# Patient Record
(demographics unavailable — no encounter records)

---

## 2025-01-16 NOTE — ASSESSMENT
[FreeTextEntry1] : Hypertension increase lisinopril  Follow-up with me in the psychiatrist has yearly physical and may

## 2025-01-16 NOTE — HISTORY OF PRESENT ILLNESS
[de-identified] : Hypertension blood pressure 156/90 has been borderline elevated in the past on lisinopril 20 will increase to 40 mg  Followed by psychiatry at [facility] on several medications unsure what they are he will return with a proper list  Elevated PSA PI-RADS score was 2 seen by Dr. Bucio we will repeat PSA.  BPH controlled with tamsulosin  Uses alprazolam rarely for anxiety  Clonazepam was effective for periodic leg movements however psychiatrist discontinued it

## 2025-01-27 NOTE — ASSESSMENT
[FreeTextEntry1] : dysuria, elev PSA, likely prostatitis - check urine cx, start bactrim course. pt has appt with uro

## 2025-01-27 NOTE — HISTORY OF PRESENT ILLNESS
[FreeTextEntry8] : Pt c/o increased urinary frequency started 2 days ago. Pt was going 20x a day w/ some dysuria. PSA 2 weeks ago was increased. Pt also f/u [facility], pt on livalo but no longer covered. Would like to switch to generic pitavastatin. Pt was on crestor 8 yrs ago but didn't tolerate it well due to myalgias.

## 2025-04-02 NOTE — HISTORY OF PRESENT ILLNESS
[FreeTextEntry1] : 03/25/2025: 76-year-old male presents for follow-up. Accompanied by his wife.   After last visit, repeat PSA was still elevated: 7.92/20%.   Patient had MRI prostate.     Seen on 2/11/2025 for Elevated PSA.   Recent PSA with PCP went up.  Denied any recent unintentional weight loss, night sweats and new bone or back pain.  Had family history of Prostate cancer: Father and Brother.  Patient had not had Prostate biopsy. Had MRI Prostate in the past.  On 1/27/2025 had bother with urination, urinary frequency, urgency and dysuria at tip of the penis.  Not related to sexual activity.  Saw PCP treated with Bactrim. Urination was back to baseline.  On Flomax, had variable stream with tapering at end, daytime frequency 5 to 10 x and nocturia 0 to 1 x.  Denied hesitancy, straining, intermittency, urgency, incontinence or sense of incomplete emptying.  Denied dysuria, hematuria, lower abdominal or flank pain, nausea, vomiting, fever, chills or rigors.  Had erectile dysfunction, rated erections: 3/5. With Tadalafil 20 mg got good response.  Not sexually active. Had low libido.  Took medicines for Anxiety and Depression.   PSA: 6.26 (2/9/2022).  MRI Prostate (3/1/2022): Prostate volume: 71 cc.  No MRI targetable lesions. Patchy inflammatory type enhancement in the peripheral zone. PIRADS 2.   Saw me in 2019 and subsequently saw Dr Bucio.  O positive

## 2025-04-02 NOTE — PHYSICAL EXAM
[General Appearance - Well Developed] : well developed [General Appearance - Well Nourished] : well nourished [Normal Appearance] : normal appearance [Well Groomed] : well groomed [General Appearance - In No Acute Distress] : no acute distress [Edema] : no peripheral edema [] : no respiratory distress [Respiration, Rhythm And Depth] : normal respiratory rhythm and effort [Exaggerated Use Of Accessory Muscles For Inspiration] : no accessory muscle use [Normal Station and Gait] : the gait and station were normal for the patient's age [Oriented To Time, Place, And Person] : oriented to person, place, and time [Not Anxious] : not anxious [Abdomen Soft] : soft [Abdomen Tenderness] : non-tender

## 2025-04-02 NOTE — ASSESSMENT
[FreeTextEntry1] : Benign Prostatic Hyperplasia: Incomplete bladder emptying: Patient drank fluid for uroflow and PVR however, was not able to urinate today. Per patient, urination was off.   Bladder scan showed residual of 457 ml.   Patient was recommended to urinate from home and return for visit to do bladder scan. If unable to do that, recommended to do RBUS.   Will continue Flomax.     Elevated PSA: PSA: 7.92/20% (2/25/2025). MRI prostate (3/20/2025): Prostate volume: 70 cc, previously 71 cc. Patchy inflammatory type enhancement throughout the peripheral zone. No MRI targetable lesions. PI-RADS 2. Will continue PSA monitoring. Will do PSA at 3 and 6 months.    Will inform results.  Return to office in 6 months or sooner if there are any issues.

## 2025-04-02 NOTE — END OF VISIT
[Time Spent: ___ minutes] : I have spent [unfilled] minutes of time on the encounter which excludes teaching and separately reported services. [FreeTextEntry3] : Parts of this note were generated by using [employer] services and Dragon medical dictation software. A reasonable effort was made to proofread and correct its contents, but typos and mistakes may still remain. If there are any questions or points of clarification needed, please contact my office.    Prior to appointment and during encounter with patient extensive medical records were reviewed including but not limited to, hospital records, outpatient records, imaging results and lab data if available. During this appointment the patient was examined, diagnoses were discussed and explained in a face-to-face manner. In addition, extensive time was spent reviewing aforementioned diagnostic studies. Counseling including test results, differential diagnoses, treatment options, risk and benefits, lifestyle changes, current condition, and current medications was performed. Patient's questions and concerns were addressed. Patient verbalized understanding of the treatment plan. Time spent is for reviewing chart, labs and images if available, counseling and care coordination.

## 2025-04-02 NOTE — HISTORY OF PRESENT ILLNESS
[FreeTextEntry1] : 03/25/2025: 76-year-old male presents for follow-up. Accompanied by his wife.   After last visit, repeat PSA was still elevated: 7.92/20%.   Patient had MRI prostate.     Seen on 2/11/2025 for Elevated PSA.   Recent PSA with PCP went up.  Denied any recent unintentional weight loss, night sweats and new bone or back pain.  Had family history of Prostate cancer: Father and Brother.  Patient had not had Prostate biopsy. Had MRI Prostate in the past.  On 1/27/2025 had bother with urination, urinary frequency, urgency and dysuria at tip of the penis.  Not related to sexual activity.  Saw PCP treated with Bactrim. Urination was back to baseline.  On Flomax, had variable stream with tapering at end, daytime frequency 5 to 10 x and nocturia 0 to 1 x.  Denied hesitancy, straining, intermittency, urgency, incontinence or sense of incomplete emptying.  Denied dysuria, hematuria, lower abdominal or flank pain, nausea, vomiting, fever, chills or rigors.  Had erectile dysfunction, rated erections: 3/5. With Tadalafil 20 mg got good response.  Not sexually active. Had low libido.  Took medicines for Anxiety and Depression.   PSA: 6.26 (2/9/2022).  MRI Prostate (3/1/2022): Prostate volume: 71 cc.  No MRI targetable lesions. Patchy inflammatory type enhancement in the peripheral zone. PIRADS 2.   Saw me in 2019 and subsequently saw Dr Bucio.

## 2025-04-02 NOTE — END OF VISIT
[Time Spent: ___ minutes] : I have spent [unfilled] minutes of time on the encounter which excludes teaching and separately reported services. [FreeTextEntry3] : Parts of this note were generated by using [employer] services and Dragon medical dictation software. A reasonable effort was made to proofread and correct its contents, but typos and mistakes may still remain. If there are any questions or points of clarification needed, please contact my office.    Prior to appointment and during encounter with patient extensive medical records were reviewed including but not limited to, hospital records, outpatient records, imaging results and lab data if available. During this appointment the patient was examined, diagnoses were discussed and explained in a face-to-face manner. In addition, extensive time was spent reviewing aforementioned diagnostic studies. Counseling including test results, differential diagnoses, treatment options, risk and benefits, lifestyle changes, current condition, and current medications was performed. Patient's questions and concerns were addressed. Patient verbalized understanding of the treatment plan. Time spent is for reviewing chart, labs and images if available, counseling and care coordination.

## 2025-04-02 NOTE — END OF VISIT
[Time Spent: ___ minutes] : I have spent [unfilled] minutes of time on the encounter which excludes teaching and separately reported services. [FreeTextEntry3] : Parts of this note were generated by using [employer] services and Dragon medical dictation software. A reasonable effort was made to proofread and correct its contents, but typos and mistakes may still remain. If there are any questions or points of clarification needed, please contact my office.    Prior to appointment and during encounter with patient extensive medical records were reviewed including but not limited to, hospital records, outpatient records, imaging results and lab data if available. During this appointment the patient was examined, diagnoses were discussed and explained in a face-to-face manner. In addition, extensive time was spent reviewing aforementioned diagnostic studies. Counseling including test results, differential diagnoses, treatment options, risk and benefits, lifestyle changes, current condition, and current medications was performed. Patient's questions and concerns were addressed. Patient verbalized understanding of the treatment plan. Time spent is for reviewing chart, labs and images if available, counseling and care coordination.

## 2025-04-02 NOTE — ADDENDUM
[FreeTextEntry1] :  Diana RUELAS assisted in documentation on 03/25/2025  acting as a scribe for Dr. Thom Davis.

## 2025-05-30 NOTE — HISTORY OF PRESENT ILLNESS
[FreeTextEntry1] : Medicare annual [de-identified] : Patient has no complaints  He asked to stop taking generic Livalo insurance will not cover we will appeal stopped statin approximately 3 weeks ago.  He is taking it for primary prevention  Elevated PSA with obstructive symptoms followed by urology now on Flomax 0.8 at bedtime  Anxious depression followed by psychiatry  Hypertension controlled with lisinopril  Continues to smoke discussion held patient knows he needs to quit screening CAT scan ordered

## 2025-05-30 NOTE — REVIEW OF SYSTEMS
[Hesitancy] : hesitancy [Frequency] : frequency [Negative] : Gastrointestinal [FreeTextEntry8] : Postvoid dribbling

## 2025-05-30 NOTE — ASSESSMENT
[FreeTextEntry1] : Renew medications patient will try to obtain his statin will consider CT screening for lung cancer follow-up with urology [Vaccines Reviewed] : Immunizations reviewed today. Please see immunization details in the vaccine log within the immunization flowsheet.

## 2025-05-30 NOTE — HEALTH RISK ASSESSMENT
[Good] : ~his/her~  mood as  good [No] : No [No falls in past year] : Patient reported no falls in the past year [Current] : Current [20 or more] : 20 or more [Change in mental status noted] : No change in mental status noted [Employed] : employed [Fully functional (bathing, dressing, toileting, transferring, walking, feeding)] : Fully functional (bathing, dressing, toileting, transferring, walking, feeding) [Fully functional (using the telephone, shopping, preparing meals, housekeeping, doing laundry, using] : Fully functional and needs no help or supervision to perform IADLs (using the telephone, shopping, preparing meals, housekeeping, doing laundry, using transportation, managing medications and managing finances) [Reports changes in hearing] : Reports no changes in hearing [ColonoscopyComments] : Cologuard last year

## 2025-05-30 NOTE — COUNSELING
[Use of nicotine replacement therapies and other medications discussed] : Use of nicotine replacement therapies and other medications discussed [Encouraged to pick a quit date and identify support needed to quit] : Encouraged to pick a quit date and identify support needed to quit [Yes] : Willing to quit smoking [FreeTextEntry1] : 5

## 2025-07-20 NOTE — ASSESSMENT
[FreeTextEntry1] : Elevated PSA: Patient's PSA has gone up from the last time when he had the MRI of the prostate.   Will get repeat MRI Prostate for possible prostate biopsy planning.  Benign Prostatic Hyperplasia: Incomplete bladder emptying: See uroflow and PVR.  Will get Urinalysis and Urine culture. Discussed concern for persistently elevated PVR.    Discussed doing cystoscopy to rule out urethral stricture. Patient agreeable. Discussed treatment options, including surgical procedures for BPH.   Will continue Flomax 2 capsules for now.   Return to office after MRI. Will do cystoscopy.

## 2025-07-20 NOTE — END OF VISIT
[Time Spent: ___ minutes] : I have spent [unfilled] minutes of time on the encounter which excludes teaching and separately reported services. [FreeTextEntry3] : Parts of this note were generated by using [employer] services and Dragon medical dictation software. A reasonable effort was made to proofread and correct its contents, but typos and mistakes may still remain. If there are any questions or points of clarification needed, please contact my office.    Prior to appointment and during encounter with patient extensive medical records were reviewed including but not limited to, hospital records, outpatient records, imaging results and lab data if available. During this appointment the patient was examined, diagnoses were discussed and explained in a face-to-face manner. In addition, extensive time was spent reviewing aforementioned diagnostic studies. Counseling including test results, differential diagnoses, treatment options, risk and benefits, lifestyle changes, current condition, and current medications was performed. Patient's questions and concerns were addressed. Patient verbalized understanding of the treatment plan. Time spent is for reviewing chart, labs and images if available, counseling and care coordination.

## 2025-07-20 NOTE — HISTORY OF PRESENT ILLNESS
[FreeTextEntry1] : 07/08/2025: 77-year-old male presents for follow-up.  Patient had come into the office for post-void residual check in 04/2025.   Since then, has been on Flomax 2 capsules.  Patient mentions that he has noticed improved urination with better flow.   However, is having no ejaculation.   Patient did do PSA last month and recent repeat. Did abstain for 48 hours before the blood test.   Seen on 03/25/2025 for follow-up. PSA: 7.92/20% (2/25/2025). MRI prostate (3/20/2025): Prostate volume: 70 cc, previously 71 cc. Patchy inflammatory type enhancement throughout the peripheral zone. No MRI targetable lesions. PI-RADS 2.  Seen on 2/11/2025 for Elevated PSA.   Recent PSA with PCP went up.  Denied any recent unintentional weight loss, night sweats and new bone or back pain.  Had family history of Prostate cancer: Father and Brother.  Patient had not had Prostate biopsy. Had MRI Prostate in the past.  On 1/27/2025 had bother with urination, urinary frequency, urgency and dysuria at tip of the penis.  Not related to sexual activity.  Saw PCP treated with Bactrim. Urination was back to baseline.  On Flomax, had variable stream with tapering at end, daytime frequency 5 to 10 x and nocturia 0 to 1 x.  Denied hesitancy, straining, intermittency, urgency, incontinence or sense of incomplete emptying.  Denied dysuria, hematuria, lower abdominal or flank pain, nausea, vomiting, fever, chills or rigors.  Had erectile dysfunction, rated erections: 3/5. With Tadalafil 20 mg got good response.  Not sexually active. Had low libido.  Took medicines for Anxiety and Depression.   PSA: 6.26 (2/9/2022).  MRI Prostate (3/1/2022): Prostate volume: 71 cc.  No MRI targetable lesions. Patchy inflammatory type enhancement in the peripheral zone. PIRADS 2.   Saw me in 2019 and subsequently saw Dr Bucio.

## 2025-07-20 NOTE — PHYSICAL EXAM
[Abdomen Soft] : soft [Abdomen Tenderness] : non-tender [Normal Appearance] : normal appearance [General Appearance - In No Acute Distress] : no acute distress [] : no respiratory distress [Respiration, Rhythm And Depth] : normal respiratory rhythm and effort [Normal Station and Gait] : the gait and station were normal for the patient's age [Oriented To Time, Place, And Person] : oriented to person, place, and time [de-identified] : normal peripheral circulation

## 2025-07-20 NOTE — PHYSICAL EXAM
[Abdomen Soft] : soft [Abdomen Tenderness] : non-tender [Normal Appearance] : normal appearance [General Appearance - In No Acute Distress] : no acute distress [] : no respiratory distress [Respiration, Rhythm And Depth] : normal respiratory rhythm and effort [Normal Station and Gait] : the gait and station were normal for the patient's age [Oriented To Time, Place, And Person] : oriented to person, place, and time [de-identified] : normal peripheral circulation

## 2025-07-20 NOTE — ADDENDUM
[FreeTextEntry1] :  Diana RUELAS assisted in documentation on 07/08/2025  acting as a scribe for Dr. Thom Davis.

## 2025-07-20 NOTE — END OF VISIT
[Time Spent: ___ minutes] : I have spent [unfilled] minutes of time on the encounter which excludes teaching and separately reported services. [FreeTextEntry3] : Parts of this note were generated by using [employer] services and Dragon medical dictation software. A reasonable effort was made to proofread and correct its contents, but typos and mistakes may still remain. If there are any questions or points of clarification needed, please contact my office.    Prior to appointment and during encounter with patient extensive medical records were reviewed including but not limited to, hospital records, outpatient records, imaging results and lab data if available. During this appointment the patient was examined, diagnoses were discussed and explained in a face-to-face manner. In addition, extensive time was spent reviewing aforementioned diagnostic studies. Counseling including test results, differential diagnoses, treatment options, risk and benefits, lifestyle changes, current condition, and current medications was performed. Patient's questions and concerns were addressed. Patient verbalized understanding of the treatment plan. Time spent is for reviewing chart, labs and images if available, counseling and care coordination.